# Patient Record
Sex: MALE | Race: WHITE | URBAN - METROPOLITAN AREA
[De-identification: names, ages, dates, MRNs, and addresses within clinical notes are randomized per-mention and may not be internally consistent; named-entity substitution may affect disease eponyms.]

---
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
## 2020-08-26 ENCOUNTER — AMBULATORY - RIVER FALLS (OUTPATIENT)
Dept: FAMILY MEDICINE | Facility: CLINIC | Age: 20
End: 2020-08-26

## 2020-08-26 ENCOUNTER — OFFICE VISIT - RIVER FALLS (OUTPATIENT)
Dept: FAMILY MEDICINE | Facility: CLINIC | Age: 20
End: 2020-08-26

## 2020-08-29 LAB — SARS-COV-2 RNA SPEC QL NAA+PROBE: NOT DETECTED

## 2021-03-08 ENCOUNTER — COMMUNICATION - HEALTHEAST (OUTPATIENT)
Dept: SCHEDULING | Facility: CLINIC | Age: 21
End: 2021-03-08

## 2021-03-08 ENCOUNTER — NURSE TRIAGE (OUTPATIENT)
Dept: NURSING | Facility: CLINIC | Age: 21
End: 2021-03-08

## 2021-03-10 ENCOUNTER — OFFICE VISIT - RIVER FALLS (OUTPATIENT)
Dept: FAMILY MEDICINE | Facility: CLINIC | Age: 21
End: 2021-03-10

## 2021-03-10 ASSESSMENT — MIFFLIN-ST. JEOR: SCORE: 2011.08

## 2021-03-18 ENCOUNTER — COMMUNICATION - HEALTHEAST (OUTPATIENT)
Dept: SCHEDULING | Facility: CLINIC | Age: 21
End: 2021-03-18

## 2021-04-26 ENCOUNTER — OFFICE VISIT - RIVER FALLS (OUTPATIENT)
Dept: FAMILY MEDICINE | Facility: CLINIC | Age: 21
End: 2021-04-26

## 2021-07-20 NOTE — TELEPHONE ENCOUNTER
Pt is calling.    Bumps on his right inner thigh, that he thought that were ingrown pimples. 2 bumps. Getting larger in size. Painful. One is the size of a dime, and the other is the size of a pea. One is fluid filled, white head, surrounded by dark red, then pink. The smaller one is pink in color. Not fluid filled. He stated that he also has a few smaller ones as well. No red streaks seen. No fever.  Care advice reviewed. He verbalized understanding.    Reason for Disposition    [1] Boil > 1/2 inch across (> 12 mm; larger than a marble) AND [2] center is soft or pus colored    Additional Information    Negative: [1] Widespread rash AND [2] bright red, sunburn-like AND [3] too weak to stand    Negative: Sounds like a life-threatening emergency to the triager    Negative: Painful lump or swelling at opening to anus (rectum)    Negative: Painful lump or swelling at opening to vagina (on labia)    Negative: Painful lump or swelling on scrotum    Negative: Impetigo suspected or diagnosed    Negative: Doesn't match the SYMPTOMS of a boil    Negative: MRSA, questions about (No boil or other skin lesion)    Negative: Widespread red rash    Negative: Black (necrotic) color or blisters develop in wound    Negative: Patient sounds very sick or weak to the triager    Negative: SEVERE pain (e.g., excruciating)    Negative: Red streak from area of infection    Negative: Fever > 100.5 F (38.1 C)    Negative: Boil > 2 inches across (> 5 cm; larger than a golf ball or ping pong ball)    Protocols used: BOIL (SKIN ABSCESS)-CARON Hooks RN   [facility] Nurse Advisor  03/08/21 at 9:13 PM

## 2021-07-20 NOTE — TELEPHONE ENCOUNTER
"Pt reports starting an antibiotic for a skin infection 8 days ago, he took the medication for 2 days and then stopped because his infection \"went away\".  He is calling tonight to report symptoms are coming back and questions if he can start the abx again.      This RN advised pt to start taking the antibiotic again and to contact his clinic ([facility]) in the AM to see if his provider wants to give him a refill of the remaining days.      Patient verbalized understanding and had no further questions.      Mile Mccoy RN, [facility]    Reason for Disposition    Caller has NON-URGENT medication question about med that PCP prescribed and triager unable to answer question    Additional Information    Negative: MORE THAN A DOUBLE DOSE of a prescription or over-the-counter (OTC) drug    Negative: [1] DOUBLE DOSE (an extra dose or lesser amount) of over-the-counter (OTC) drug AND [2] any symptoms (e.g., dizziness, nausea, pain, sleepiness)    Negative: [1] DOUBLE DOSE (an extra dose or lesser amount) of prescription drug AND [2] any symptoms (e.g., dizziness, nausea, pain, sleepiness)    Negative: Took another person's prescription drug    Negative: [1] DOUBLE DOSE (an extra dose or lesser amount) of prescription drug AND [2] NO symptoms (Exception: a double dose of antibiotics)    Negative: Diabetes drug error or overdose (e.g., insulin or extra dose)    Negative: [1] Request for URGENT new prescription or refill of \"essential\" medication (i.e., likelihood of harm to patient if not taken) AND [2] triager unable to fill per unit policy    Negative: [1] Prescription not at pharmacy AND [2] was prescribed today by PCP    Negative: Pharmacy calling with prescription questions and triager unable to answer question    Negative: Caller has URGENT medication question about med that PCP prescribed and triager unable to answer question    Protocols used: MEDICATION QUESTION CALL-A-AH      "

## 2022-02-11 VITALS
BODY MASS INDEX: 28.73 KG/M2 | DIASTOLIC BLOOD PRESSURE: 76 MMHG | HEIGHT: 69 IN | SYSTOLIC BLOOD PRESSURE: 114 MMHG | TEMPERATURE: 97.9 F | WEIGHT: 194 LBS | HEART RATE: 80 BPM

## 2022-02-11 VITALS
RESPIRATION RATE: 16 BRPM | BODY MASS INDEX: 33.77 KG/M2 | TEMPERATURE: 98 F | HEART RATE: 72 BPM | HEIGHT: 69 IN | WEIGHT: 227 LBS | SYSTOLIC BLOOD PRESSURE: 120 MMHG | DIASTOLIC BLOOD PRESSURE: 78 MMHG | HEIGHT: 69 IN | BODY MASS INDEX: 33.62 KG/M2

## 2022-02-15 NOTE — PROGRESS NOTES
"   Patient:   CHRIS CEVALLOS            MRN: [id number]            FIN: [id number]               Age:   20 years     Sex:  Male     :  2000   Associated Diagnoses:   Ingrown hair   Author:   Jose العراقي PA-C      Chief Complaint   3/10/2021 5:09 PM CST    Pt here for \"ingrown hairs\" on both upper/inner thighs x 2-3 weeks.        History of Present Illness   Chief complaint and symptoms noted above and confirmed with patient   3 week hx of ingrown hairs on bilateral inner thighs  has been trying to keep the area clean and bandaged,  also tried epsom salt baths  there has been some drainage of pus and blood      Review of Systems   Constitutional:  Negative.    Ear/Nose/Mouth/Throat:  Negative.    Respiratory:  Negative.       Health Status   Allergies:    Allergic Reactions (Selected)  Severity Not Documented  Amoxicillin (Rash)   Medications:  (Selected)   Documented Medications  Documented  escitalopram 10 mg oral tablet: = 1 tab(s) ( 10 mg ), Oral, daily, # 30 tab(s), 0 Refill(s), Type: Maintenance,    Medications          *denotes recorded medication          *escitalopram 10 mg oral tablet: 10 mg, 1 tab(s), Oral, daily, 30 tab(s), 0 Refill(s).       Problem list:    All Problems  History of chicken pox / SNOMED CT 695026983 / Confirmed  Obesity / SNOMED CT 0473694658 / Probable      Histories   Past Medical History:    Active  History of chicken pox ([id number])   Family History:    No family history items have been selected or recorded.   Procedure history:    Tonsillectomy and adenoidectomy (815425101).   Social History:        Electronic Cigarette/Vaping Assessment            Electronic Cigarette Use: Never.      Alcohol Assessment            Never      Tobacco Assessment            Never (less than 100 in lifetime)            Never smoker      Employment and Education Assessment            Student      Home and Environment Assessment            Marital status: Single.  Risks in environment: Unlocked guns, " Does not wear helmet.      Nutrition and Health Assessment            Type of diet: Regular.      Sexual Assessment            Sexually active: No.        Physical Examination   Vital Signs   3/10/2021 5:09 PM CST Temperature Tympanic 98 DegF    Peripheral Pulse Rate 72 bpm    Pulse Site Radial artery    Respiratory Rate 16 br/min    Systolic Blood Pressure 120 mmHg    Diastolic Blood Pressure 78 mmHg    Mean Arterial Pressure 92 mmHg    BP Site Right arm      Measurements from flowsheet : Measurements   3/10/2021 5:09 PM CST Height Measured - Standard 68.75 in    Weight Measured - Standard 227 lb    BSA 2.23 m2    Body Mass Index 33.76 kg/m2  HI      General:  No acute distress.    Integumentary:  on inner right thigh there are 4-5 infected ingrown hairs, slight drainage from 2 of them  on inner left thigh there are 2 red inflamed hair follicles, no drainage.       Impression and Plan   Diagnosis     Ingrown hair (QFU00-HN L73.1).     Summary:  will treat with 10 day course of Bactrim DS, also apply topical triple antibiotic daily until healed  follow up if not improving.    Orders     Orders   Pharmacy:  Bactrim  mg-160 mg oral tablet (Prescribe): 1 tab(s), Oral, bid, x 10 day(s), # 20 tab(s), 0 Refill(s), Type: Acute, [id number], 1 tab(s) Oral bid,x10 day(s), 68.75, in, 3/10/2021 5:09 PM CST, Height Measured, 227, lb, 3/10/2021 5:09 PM CST, Weight Measured.     Orders   Charges (Evaluation and Management):  02076 office o/p est low 20-29 min (Charge) (Order): Quantity: 1, Ingrown hair.

## 2022-02-15 NOTE — NURSING NOTE
Comprehensive Intake Entered On:  8/26/2020 11:01 AM CDT    Performed On:  8/26/2020 11:01 AM CDT by Che Huston CMA               Summary   Chief Complaint :   consent for video visit to discuss COVID   Che Huston CMA - 8/26/2020 11:01 AM CDT   Health Status   Allergies Verified? :   Yes   Medication History Verified? :   Yes   Medical History Verified? :   Yes   Tobacco Use? :   Never smoker   Che Huston CMA - 8/26/2020 11:01 AM CDT   Consents   Consent for Immunization Exchange :   Consent Granted   Consent for Immunizations to Providers :   Consent Granted   Che Huston CMA - 8/26/2020 11:01 AM CDT   Meds / Allergies   (As Of: 8/26/2020 11:01:59 AM CDT)   Allergies (Active)   amoxicillin  Estimated Onset Date:   Unspecified ; Reactions:   rash ; Created By:   Rosette Foster CMA; Reaction Status:   Active ; Category:   Drug ; Substance:   amoxicillin ; Type:   Allergy ; Updated By:   Rosette Foster CMA; Reviewed Date:   8/26/2020 11:01 AM CDT        Medication List   (As Of: 8/26/2020 11:01:59 AM CDT)   Prescription/Discharge Order    cephalexin  :   cephalexin ; Status:   Processing ; Ordered As Mnemonic:   Keflex 500 mg oral capsule ; Ordering Provider:   Bouchra Milian; Action Display:   Complete ; Catalog Code:   cephalexin ; Order Dt/Tm:   8/26/2020 11:01:46 AM CDT            ID Risk Screen   Recent Travel History :   No recent travel   Family Member Travel History :   No recent travel   Other Exposure to Infectious Disease :   Unknown   Che Huston CMA - 8/26/2020 11:01 AM CDT

## 2022-02-15 NOTE — TELEPHONE ENCOUNTER
---------------------  From: Phong Hair LPNi L (Phone Messages Pool ([id number]))   To: [facility] Message Pool ([address] [place] [address]);     Sent: 3/19/2021 1:40:36 PM CDT  Subject: antibiotic question     Phone Message    PCP:   CLINT      Time of Call:  1:35pm       Person Calling:  pt  Phone number:  [phone]    Note:   Pt calling stating he was prescribed antibiotics last week for an infection on his leg. Pt says he stopped the antibiotic after a couple days because his leg was better. Pt says infection is now coming back.    Pt says he only took 5 tablets of Rx so still has 15 tabs remaining.    Pt wondering if he can just finish the 15 tabs or if he will need new Rx to start Rx all over.    Please advise. Pt prescribed Bactrim DS on 3/10    Last office visit and reason:  3/10/21 ingrown hairs---------------------  From: Riley Salgado CMA ([facility] Message Pool ([id number]))   To: Jose العراقي PA-C;     Sent: 3/19/2021 1:54:51 PM CDT  Subject: FW: antibiotic question---------------------  From: Jose العراقي PA-C   To: [facility] Message Pool ([id number]);     Sent: 3/19/2021 2:41:47 PM CDT  Subject: RE: antibiotic question     Finish the complete course of antibiotics.  Follow up if not improvingI called pt and gave him DWG message.  Riley Salgado CMA

## 2022-02-15 NOTE — NURSING NOTE
Depression Screening Entered On:  3/10/2021 6:27 PM CST    Performed On:  3/10/2021 6:27 PM CST by Riley Salgado CMA               Depression Screening   Little Interest - Pleasure in Activities :   More than half the days   Feeling Down, Depressed, Hopeless :   More than half the days   Initial Depression Screen Score :   4 Score   Poor Appetite or Overeating :   More than half the days   Trouble Falling or Staying Asleep :   Several days   Feeling Tired or Little Energy :   Several days   Feeling Bad About Yourself :   More than half the days   Trouble Concentrating :   Several days   Moving or Speaking Slowly :   More than half the days   Thoughts Better Off Dead or Hurting Self :   Several days   Difficulty at Work, Home, Getting Along :   Somewhat difficult   Detailed Depression Screen Score :   10    Total Depression Screen Score :   14    Riley Salgado CMA - 3/10/2021 6:27 PM CST

## 2022-02-15 NOTE — PROGRESS NOTES
Chief Complaint    Verbal consent given for video visit. Sore throat for the past two days, noticed open sore in back of throat.   Visit type:  Video visit via [place] Mars or Norse   Participants in room during visit:  patient   Location of patient:  home   Location of physician:  office   Video start time:  0740   Video end time:  0748   Today's visit was conducted by video conference due to the COVID-19 pandemic.  The patient's consent to proceed with the video conference was obtained and documented.  History of Present Illness      Patient c/o a sore throat for the last two days.  He noticed a sore on the soft palate which is irritated when he swallows.  Denies f/c/s, swollen glands  Review of Systems          ROS reviewed and negative except for symptoms noted in HPI.  Physical Exam   Vitals & Measurements    HT: 68.75 in       Appears well, NAD      erythematous area on the soft palate to the right of the uvula  Assessment/Plan       1. Aphthous ulcer (K12.0)         observe, topical anesthetics, follow up if not improving in the nest several days  Patient Information     Name:CHRIS CEVALLOS      Address:      [address] [place] [address] [id number]     Sex:Male     YOB: 2000     Phone:[phone]     Emergency Contact:SHANE GAONA     MRN:[id number]     FIN:[id number]     Location:[facility]     Date of Service:04/26/2021      Primary Care Physician:       NONE ,       Attending Physician:       Carter Buckner MD, [phone]  Problem List/Past Medical History    Ongoing     History of chicken pox     Obesity    Historical     No qualifying data  Procedure/Surgical History     Tonsillectomy and adenoidectomy        Medications    escitalopram 10 mg oral tablet, 10 mg= 1 tab(s), Oral, daily  Allergies    amoxicillin (rash)  Social History    Smoking Status     Never smoker     Alcohol      Never     Electronic Cigarette/Vaping      Electronic Cigarette Use:  Never.     Employment/School      Student     Home/Environment      Marital status: Single. Risks in environment: Unlocked guns, Does not wear helmet.     Nutrition/Health      Type of diet: Regular.     Sexual      Sexually active: No.     Tobacco      Never (less than 100 in lifetime)  Immunizations      Vaccine Date Status          tetanus/diphth/pertuss (Tdap) adult/adol 11/10/2020 Recorded          influenza, H1N1, inactivated 11/10/2020 Recorded          influenza, H1N1, inactivated 10/04/2017 Recorded          influenza virus vaccine, inactivated 10/04/2017 Recorded          meningococcal conjugate vaccine 08/29/2016 Recorded          influenza, H1N1, inactivated 08/29/2016 Recorded          influenza, H1N1, inactivated 01/22/2016 Recorded          human papillomavirus vaccine 02/13/2015 Recorded          Hep A 02/13/2015 Recorded          influenza, H1N1, inactivated 09/24/2014 Recorded          human papillomavirus vaccine 09/24/2014 Recorded          human papillomavirus vaccine 07/17/2014 Recorded          Hep A 07/17/2014 Recorded          influenza, H1N1, inactivated 10/25/2011 Recorded          meningococcal conjugate vaccine 07/28/2011 Recorded          tetanus/diphth/pertuss (Tdap) adult/adol 11/06/2009 Recorded          influenza, H1N1, inactivated 11/06/2009 Recorded          varicella 06/15/2007 Recorded          IPV 07/11/2003 Recorded          MMR (measles/mumps/rubella) 07/11/2003 Recorded          DTaP 07/11/2003 Recorded          pneumococcal 08/14/2000 Recorded          Hib 09/22/1999 Recorded          DTaP 09/22/1999 Recorded          varicella 05/24/1999 Recorded          IPV 05/24/1999 Recorded          MMR (measles/mumps/rubella) 05/24/1999 Recorded          rotavirus vaccine 03/01/1999 Recorded          rotavirus vaccine 01/11/1999 Recorded          rotavirus vaccine 1998 Recorded          Hib 1998 Recorded          Hep B 1998 Recorded          DTaP 1998  Recorded          IPV 1998 Recorded          Hib 1998 Recorded          DTaP 1998 Recorded          IPV 1998 Recorded          Hib 1998 Recorded          Hep B 1998 Recorded          DTaP 1998 Recorded          Hep B 1998 Recorded

## 2022-02-15 NOTE — NURSING NOTE
CAGE Assessment Entered On:  3/10/2021 6:27 PM CST    Performed On:  3/10/2021 6:27 PM CST by Riley Salgado CMA               Assessment   Have you ever felt you should cut down on your drinking :   No   Have people annoyed you by criticizing your drinking :   No   Have you ever felt bad or guilty about your drinking :   No   Have you ever taken a drink first thing in the morning to steady your nerves or get rid of a hangover (Eye-opener) :   No   CAGE Score :   0    Riley Salgado CMA - 3/10/2021 6:27 PM CST

## 2022-02-15 NOTE — PROGRESS NOTES
Patient:   CHRIS CEVALLOS            MRN: [id number]            FIN: [id number]               Age:   20 years     Sex:  Male     :  2000   Associated Diagnoses:   Exposure to COVID-19 virus   Author:   Jose Lopez MD      Impression and Plan   Diagnosis     Exposure to COVID-19 virus (IEZ47-DY Z20.828).     Orders     Orders   Charges (Evaluation and Management):  79377 office outpatient visit 15 minutes (Charge) (Order): Quantity: 1, Exposure to COVID-19 virus.     Orders (Selected)   Outpatient Orders  Ordered (Dispatched)  SARS-CoV-2 RNA (COVID-19), Qualitative NAAT* ([facility]): Specimen Type: Nasopharyngeal Swab, Collection Date: 20 11:01:00 CDT.        Visit Information      Date of Service: 2020 10:32 am  Performing Location: [facility]  Encounter#: [id number]      Primary Care Provider (PCP):  NONE ,       Referring Provider:  Jose Lopez MD    NPI# [id number]   Visit type:  Video Visit via Doximity.    Participants in room during visit:  _Patient only   Accompanied by:  No one.    Source of history:  Self.    Location of patient:  _home  Location of provider:  _ Clinic  Video Start Time:  _1040  Video End Time:   _1100    Today's visit was conducted via video conference due to the COVID-19 pandemic.  The patient's consent to proceed with a video visit has been obtained and documented.   Referral source:  Self.    History limitation:  None.       Chief Complaint   2020 11:01 AM CDT   consent for video visit to discuss COVID        History of Present Illness   Patient is a 20_ year old M_ who is being evaluated via a billable video visit.  Patient was exposed to COVID-19 five days ago.  Room mate who tested positive just found out this morning.  Patient has no symptoms.  Patient desires testing and will quarantine until results are available.      Review of Systems   Constitutional:  Negative.    Eye:  Negative.    Ear/Nose/Mouth/Throat:  Negative.    Respiratory:   Negative.    Cardiovascular:  Negative.    Gastrointestinal:  Negative.    Genitourinary:  Negative.    Immunologic:  Negative.    Musculoskeletal:  Negative.    Integumentary:  Negative.    Neurologic:  Negative.    Psychiatric:  Negative.       Health Status   Allergies:    Allergic Reactions (Selected)  Severity Not Documented  Amoxicillin (Rash)   Medications:  (Selected)   ,    Medications          No medications documented     Problem list:    All Problems  History of chicken pox / SNOMED CT 333033888 / Confirmed      Histories   Past Medical History:    Active  History of chicken pox ([id number])   Family History:    No family history items have been selected or recorded.   Procedure history:    Tonsillectomy and adenoidectomy (SNOMED CT 947733350).   Social History:        Alcohol Assessment            Never      Tobacco Assessment            Never smoker      Employment and Education Assessment            Student      Home and Environment Assessment            Marital status: Single.  Risks in environment: Unlocked guns, Does not wear helmet.      Nutrition and Health Assessment            Type of diet: Regular.      Sexual Assessment            Sexually active: No.        Physical Examination   General:  Alert and oriented, No acute distress.    Eye:  Pupils are equal, round and reactive to light, Extraocular movements are intact, Normal conjunctiva.    Respiratory:  Respirations are non-labored.    Integumentary:  Warm, Dry, Pink.    Neurologic:  Normal motor function.    Psychiatric:  Cooperative, Appropriate mood & affect, Normal judgment, Non-suicidal.         Mood and affect: Calm.         Behavior: Relaxed.         Judgment: Able to make sensible decisions, Appropriate in social situations.         Thought process: Appropriate.       Health Maintenance      Recommendations     Pending (in the next year)        OverDue           Depression Screen (Male) due  05/11/18  and every 1  year(s)            Body Mass Index Check (Male) due  05/11/18  and every 1  year(s)           High Blood Pressure Screen (Male) due  05/11/18  and every 1  year(s)        Due            Alcohol Misuse Screen (Male) due  08/26/20  and every 1  year(s)           HIV Screen (if sexually active) (Male) due  08/26/20  and every 1  year(s)           STD Counseling (if sexually active) (Male) due  08/26/20  and every 1  year(s)           Syphilis Screen (if sexually active) (Male) due  08/26/20  and every 1  year(s)           Tetanus Vaccine due  08/26/20  and every 10  year(s)        Near Due            Influenza Vaccine near due  08/31/20  and every 1  year(s)     Satisfied (in the past 1 year)        Satisfied            Tobacco Use Screen (Male) on  08/26/20.

## 2022-02-15 NOTE — NURSING NOTE
Comprehensive Intake Entered On:  4/26/2021 7:30 AM CDT    Performed On:  4/26/2021 7:25 AM CDT by Krystin Dennis LPN               Summary   Chief Complaint :   Verbal consent given for video visit. Sore throat for the past two days, noticed open sore in back of throat.    Height Measured :   68.75 in(Converted to: 5 ft 9 in, 174.62 cm)    Krystin Dennis LPN - 4/26/2021 7:25 AM CDT   Health Status   Allergies Verified? :   Yes   Medication History Verified? :   Yes   Pre-Visit Planning Status :   Completed   Krystin Dennis LPN - 4/26/2021 7:25 AM CDT   Consents   Consent for Immunization Exchange :   Consent Granted   Consent for Immunizations to Providers :   Consent Granted   Krystin Dennis LPN - 4/26/2021 7:25 AM CDT   Meds / Allergies   (As Of: 4/26/2021 7:30:53 AM CDT)   Allergies (Active)   amoxicillin  Estimated Onset Date:   Unspecified ; Reactions:   rash ; Created By:   Rosette Foster CMA; Reaction Status:   Active ; Category:   Drug ; Substance:   amoxicillin ; Type:   Allergy ; Updated By:   Rosette Foster CMA; Reviewed Date:   4/26/2021 7:26 AM CDT        Medication List   (As Of: 4/26/2021 7:30:53 AM CDT)   Home Meds    escitalopram  :   escitalopram ; Status:   Documented ; Ordered As Mnemonic:   escitalopram 10 mg oral tablet ; Simple Display Line:   10 mg, 1 tab(s), Oral, daily, 30 tab(s), 0 Refill(s) ; Catalog Code:   escitalopram ; Order Dt/Tm:   3/10/2021 5:12:56 PM CST            ID Risk Screen   Recent Travel History :   No recent travel   Family Member Travel History :   No recent travel   Other Exposure to Infectious Disease :   Unknown   COVID-19 Testing Status :   No positive COVID-19 test   Krystin Dennis LPN - 4/26/2021 7:25 AM CDT

## 2022-02-15 NOTE — NURSING NOTE
"Comprehensive Intake Entered On:  3/10/2021 5:15 PM CST    Performed On:  3/10/2021 5:09 PM CST by Riley Salgado CMA               Summary   Chief Complaint :   Pt here for \"ingrown hairs\" on both upper/inner thighs x 2-3 weeks.   Weight Measured :   227 lb(Converted to: 227 lb 0 oz, 102.965 kg)    Height Measured :   68.75 in(Converted to: 5 ft 9 in, 174.62 cm)    Body Mass Index :   33.76 kg/m2 (HI)    Body Surface Area :   2.23 m2   Systolic Blood Pressure :   120 mmHg   Diastolic Blood Pressure :   78 mmHg   Mean Arterial Pressure :   92 mmHg   Peripheral Pulse Rate :   72 bpm   BP Site :   Right arm   Pulse Site :   Radial artery   Temperature Tympanic :   98 DegF(Converted to: 36.7 DegC)    Respiratory Rate :   16 br/min   Riley Salgado CMA - 3/10/2021 5:09 PM CST   Health Status   Allergies Verified? :   Yes   Medication History Verified? :   Yes   Medical History Verified? :   Yes   Pre-Visit Planning Status :   Not completed   Tobacco Use? :   Never smoker   Riley Salgado CMA - 3/10/2021 5:09 PM CST   Meds / Allergies   (As Of: 3/10/2021 5:15:31 PM CST)   Allergies (Active)   amoxicillin  Estimated Onset Date:   Unspecified ; Reactions:   rash ; Created By:   Rosette Foster CMA; Reaction Status:   Active ; Category:   Drug ; Substance:   amoxicillin ; Type:   Allergy ; Updated By:   Rosette Foster CMA; Reviewed Date:   3/10/2021 5:13 PM CST        Medication List   (As Of: 3/10/2021 5:15:31 PM CST)   Home Meds    escitalopram  :   escitalopram ; Status:   Documented ; Ordered As Mnemonic:   escitalopram 10 mg oral tablet ; Simple Display Line:   10 mg, 1 tab(s), Oral, daily, 30 tab(s), 0 Refill(s) ; Catalog Code:   escitalopram ; Order Dt/Tm:   3/10/2021 5:12:56 PM CST            ID Risk Screen   Recent Travel History :   No recent travel   Family Member Travel History :   No recent travel   Other Exposure to Infectious Disease :   Unknown   COVID-19 Testing Status :   No positive COVID-19 test "   Riley Salgado CMA - 3/10/2021 5:09 PM CST   Social History   Social History   (As Of: 3/10/2021 5:15:31 PM CST)   Alcohol:        Never   (Last Updated: 5/12/2017 7:11:44 AM CDT by Laura Weiss)          Tobacco:        Never smoker   (Last Updated: 5/12/2017 7:11:49 AM CDT by Laura Weiss)   Never (less than 100 in lifetime)   (Last Updated: 3/10/2021 5:09:16 PM CST by Riley Salgado CMA)          Electronic Cigarette/Vaping:        Electronic Cigarette Use: Never.   (Last Updated: 3/10/2021 5:09:20 PM CST by Riley Salgado CMA)          Employment/School:        Student   (Last Updated: 5/12/2017 7:11:58 AM CDT by Laura Weiss)          Home/Environment:        Marital status: Single.  Risks in environment: Unlocked guns, Does not wear helmet.   (Last Updated: 5/12/2017 7:12:11 AM CDT by Laura Weiss)          Nutrition/Health:        Type of diet: Regular.   (Last Updated: 5/12/2017 7:12:20 AM CDT by Laura Weiss)          Sexual:        Sexually active: No.   (Last Updated: 5/12/2017 7:12:28 AM CDT by Laura Weiss)

## 2022-02-15 NOTE — PROGRESS NOTES
Patient:   CHRIS CEVALLOS            MRN: [id number]            FIN: [id number]               Age:   16 years     Sex:  Male     :  2000   Associated Diagnoses:   Paronychia   Author:   Bouchra Milian      Chief Complaint   2017 3:52 PM CDT    Pt c/o L index finger infection by nail. Noticed on Monday.        History of Present Illness   new pt to clinic presents with his mother with red swollen left index finger inferior to DIP  at base of nail, admits he was picking at skin and it became infected over past 4d  has not tried any treatment to skin, this has never occured in past      Review of Systems   Constitutional:  Negative.    Ear/Nose/Mouth/Throat:  Negative.    Cardiovascular:  Negative.    Musculoskeletal:  Negative.    Integumentary:  Negative except as documented in history of present illness.    Neurologic:  Negative.    Psychiatric:  Negative.       Health Status   Allergies:    Allergic Reactions (Selected)  Severity Not Documented  Amoxicillin (Rash)   Medications:  (Selected)   Prescriptions  Prescribed  Keflex 500 mg oral capsule: 1 cap(s) ( 500 mg ), PO, QID, # 28 cap(s), 0 Refill(s), Type: Maintenance, Pharmacy: [id number], 1 cap(s) po qid,x7 day(s)      Histories   Past Medical History:    No active or resolved past medical history items have been selected or recorded.   Family History:    No family history items have been selected or recorded.      Physical Examination   Vital Signs   2017 3:52 PM CDT Temperature Tympanic 97.9 DegF    Peripheral Pulse Rate 80 bpm    Pulse Site Radial artery    HR Method Manual    Systolic Blood Pressure 114 mmHg    Diastolic Blood Pressure 76 mmHg    Mean Arterial Pressure 89 mmHg    BP Site Right arm    BP Method Manual      General:  Alert and oriented, No acute distress.    Eye:  Pupils are equal, round and reactive to light.    HENT:  Normocephalic.    Respiratory:  Respirations are non-labored.    Cardiovascular:  Regular  rhythm, No edema.    Musculoskeletal:  Normal range of motion, Normal gait.    Integumentary:  Warm, Dry, Pink, left index finger from DIP to base of nail red and swollen, warm and painful to touch, area cleansed with alcohol and wiped off with sterile gauze, 18g needle used to incise fluctuant area with small amount of purulent discharge released  finger cleansed again and wrapped in bandaide  pt tolerated well.    Neurologic:  Alert, Oriented, Normal sensory, No focal deficits.    Psychiatric:  Cooperative, Appropriate mood & affect, Normal judgment.       Impression and Plan   Diagnosis     Paronychia ([id number]).     Patient Instructions:       Counseled: Patient, Family, Regarding diagnosis, Regarding medications, Activity, Verbalized understanding.    Summary:  discussed paronychia, discussed antibiotic therapy, needs to soak digit in warm water up to 5x/d  if symptoms are not improving in next 72 hours please recheck, if symptoms worsen before then I would like him rechecked.    Orders     Orders (Selected)   Prescriptions  Prescribed  Keflex 500 mg oral capsule: 1 cap(s) ( 500 mg ), PO, QID, # 28 cap(s), 0 Refill(s), Type: Maintenance, Pharmacy: [id number], 1 cap(s) po qid,x7 day(s).

## 2022-02-15 NOTE — NURSING NOTE
Patient presented for [facility] testing per RICKY.  O2 sat: 98%.  Specimen sent to [facility] lab.  Forms faxed to [facility].